# Patient Record
Sex: FEMALE | Race: WHITE | NOT HISPANIC OR LATINO | Employment: UNEMPLOYED | URBAN - METROPOLITAN AREA
[De-identification: names, ages, dates, MRNs, and addresses within clinical notes are randomized per-mention and may not be internally consistent; named-entity substitution may affect disease eponyms.]

---

## 2018-09-25 ENCOUNTER — OFFICE VISIT CONVERTED (OUTPATIENT)
Dept: PULMONOLOGY | Facility: CLINIC | Age: 48
End: 2018-09-25
Attending: INTERNAL MEDICINE

## 2018-12-28 ENCOUNTER — OFFICE VISIT CONVERTED (OUTPATIENT)
Dept: PULMONOLOGY | Facility: CLINIC | Age: 48
End: 2018-12-28
Attending: INTERNAL MEDICINE

## 2019-10-10 ENCOUNTER — HOSPITAL ENCOUNTER (OUTPATIENT)
Dept: MAMMOGRAPHY | Facility: HOSPITAL | Age: 49
Discharge: HOME | End: 2019-10-10

## 2021-05-28 VITALS
HEART RATE: 76 BPM | WEIGHT: 293 LBS | HEART RATE: 83 BPM | RESPIRATION RATE: 12 BRPM | BODY MASS INDEX: 48.82 KG/M2 | TEMPERATURE: 98.2 F | SYSTOLIC BLOOD PRESSURE: 142 MMHG | WEIGHT: 293 LBS | TEMPERATURE: 98.2 F | HEIGHT: 65 IN | DIASTOLIC BLOOD PRESSURE: 75 MMHG | BODY MASS INDEX: 48.82 KG/M2 | HEIGHT: 65 IN | SYSTOLIC BLOOD PRESSURE: 143 MMHG | DIASTOLIC BLOOD PRESSURE: 80 MMHG | RESPIRATION RATE: 16 BRPM | OXYGEN SATURATION: 94 % | OXYGEN SATURATION: 95 %

## 2021-05-28 NOTE — PROGRESS NOTES
Patient: ROSELIA MEDINA     Acct: [id number]     Report: #[id number]  UNIT #: [id number]     : 1970    Encounter Date:2018  PRIMARY CARE: Chato Samaniego  ***Signed***  --------------------------------------------------------------------------------------------------------------------  Chief Complaint      Encounter Date      Sep 25, 2018            Primary Care Provider      Chato Samaniego            Referring Provider      Chato Samaniego            Patient Complaint      Patient is complaining of      asthma            VITALS      Height 5 ft 5.00 in / 165.1 cm      Weight 319 lbs 2.000 oz / 144.807831 kg      BSA 2.67 m2      BMI 53.1 kg/m2      Temperature 98.2 F / 36.78 C - Oral      Pulse 83      Respirations 12      Blood Pressure 142/80 Sitting, Left Arm      Pulse Oximetry 95%, roomair      Exhaled Nitrous Oxide Testin            HPI      The patient is a very pleasant 48 year old superobese  female with a     BMI of 53.1 with reported history of asthma here for evaluation. The patient is     noncompliant with her therapies. She states that she has an inhaler and had had     multiple ones, but only takes them when she feels like she needs them.  She     currently is not interested in taking any inhalers.  She does have dyspnea on     exertion with walking about 8-900 feet, worse with exertion, moderate in     severity and relieved with rest.  This is associated with wheezing and a cough     productive of thin clear sputum.  She also has snoring and excessive daytime     sleepiness.  She states she likely has obstructive sleep apnea, but refuses     testing as she sees the CPAP machine her mother wears and would refuse it under     any circumstance.  She feels a large component of her shortness of breath is     secondary to her obesity. Her BMI has been going up and up and now is 53 today.     Despite this, she feels that she has trouble losing weight because she just does    not have  the motivation to do so.  She does have seasonal allergies well-    controlled with Singulair and denies any recent itchy-scratchy throat, watery     eyes or nasal congestion.  Denies any nausea, vomiting, fevers, chills,     headaches, chest pain or hemoptysis.  She is able to perform her ADLs without     difficulty.  Denies any swollen glands or lymph nodes of the head and neck.            I have personally reviewed the review of systems, past family, social, surgical     and medical histories and I agree with the findings.            ROS      Constitutional:  Denies: Fatigue, Fever, Weight gain, Weight loss, Chills, Inso    mnia, Other      Respiratory/Breathing:  Complains of: Shortness of air, Wheezing, Cough; Denies:    Hemoptysis, Pleuritic pain, Other      Endocrine:  Denies: Polydipsia, Polyuria, Heat/cold intolerance, Abnorml     menstrual pattern, Diabetes, Other      Eyes:  Denies: Blurred vision, Vision Changes, Other      Ears, nose, mouth, throat:  Denies: Mouth lesions, Thrush, Throat pain,     Hoarseness, Allergies/Hay Fever, Post Nasal Drip, Headaches, Recent Head Injury,    Nose Bleeding, Neck Stiffness, Thyroid Mass, Hearing Loss, Ear Fullness, Dry     Mouth, Nasal or Sinus Pain, Dry Lips, Nasal discharge, Nasal congestion, Other      Cardiovascular:  Denies: Palpitations, Syncope, Claudication, Chest Pain, Wake     up Gasping for air, Leg Swelling, Irregular Heart Rate, Cyanosis, Dyspnea on     Exertion, Other      Gastrointestinal:  Denies: Nausea, Constipation, Diarrhea, Abdominal pain,     Vomiting, Difficulty Swallowing, Reflux/Heartburn, Dysphagia, Jaundice,     Bloating, Melena, Bloody stools, Other      Genitourinary:  Denies: Urinary frequency, Incontinence, Hematuria, Urgency,     Nocturia, Dysuria, Testicular problems, Other      Musculoskeletal:  Denies: Joint Pain, Joint Stiffness, Joint Swelling, Myalgias,    Other      Hematologic/lymphatic:  DENIES: Lymphadenopathy, Bruising,  Bleeding tendencies,     Other      Neurological:  Denies: Headache, Numbness, Weakness, Seizures, Other      Psychiatric:  Denies: Anxiety, Appropriate Effect, Depression, Other      Sleep:  No: Excessive daytime sleep, Morning Headache?, Snoring, Insomnia?, Stop    breathing at sleep?, Other      Integumentary:  Denies: Rash, Dry skin, Skin Warm to Touch, Other      Immunologic/Allergic:  Denies: Latex allergy, Seasonal allergies, Asthma,     Urticaria, Eczema, Other      Immunization status:  No: Up to date            FAMILY/SOCIAL/MEDICAL HX      Surgical History:  Yes: Cholecystectomy, Orthopedic Surgery (CYSTS REMOVED FROM     RIGHT ANKLE)      Diabetes - Family Hx:  Mother, Grandparent      Cancer/Type - Family Hx:  Grandparent (x2), Other      Is Father Still Living?:  Yes      Is Mother Still Living?:  Yes       Family History:  Yes      Social History:  No Tobacco Use, No Alcohol Use, No Recreational Drug use      Smoking status:  Never smoker      Anticoagulation Therapy:  No      Antibiotic Prophylaxis:  No      Medical History:  Yes: Asthma, Deafness or Ringing Ears (BILATERAL), High Blood     Pressure, Shortness Of Breath; No: Blood Disease, Chemotherapy/Cancer, Chronic     Bronchitis/COPD, Hemorrhoids/Rectal Prob, Miscellaneous Medical/oth      Psychiatric History      none            PREVENTION      Influenza Vaccine Declined:  Yes      2 or More Falls Past Year?:  No      Fall Past Year with Injury?:  No      Hx Pneumococcal Vaccination:  No      Encouraged to follow-up with:  PCP regarding preventative exams.      Chart initiated by      clay/ ma            ALLERGIES/MEDICATIONS      Allergies:        Coded Allergies:             NO KNOWN DRUG ALLERGIES (Verified  Allergy, Unknown, 9/25/18)      Medications    Last Reconciled on 9/25/18 11:51 by STEPHANIE PAT MD      Cyanocobalamin (Vitamin B-12*) 2,500 Mcg Tab.subl      2500 MCG PO QDAY, #30 TAB.SL         Reported         9/25/18        Calcium Carb/Vit D3 (Calcium Carb 600 + D) 1 Each Tablet      1 EACH PO BID, #120 TAB 0 Refills         Reported         9/25/18       Multivitamin (Multivitamins) 1 Each Capsule      1 EACH PO QDAY, CAP         Reported         9/25/18       Ferrous Sulfate (Iron Sulfate*) 325 Mg Tablet      65 MG PO QDAY, #30 TAB 0 Refills         Reported         9/25/18       Gabapentin (Gabapentin) 250 Mg/5 Ml Solution      300 MG PO HS, #180 ML 0 Refills         Reported         9/25/18       Montelukast Sodium (Montelukast*) 10 Mg Tablet      10 MG PO HS, TAB         Reported         9/25/18       Vortioxetine Hydrobromide (TRINTELLIX) 20 Mg Tablet      20 MG PO HS, #30 TAB 0 Refills         Reported         9/25/18       Atorvastatin (Atorvastatin) 20 Mg Tablet      20 MG PO HS, #30 TAB 0 Refills         Reported         9/25/18       Metoprolol Succinate (Toprol XL*) 25 Mg Tab.er.24h      25 MG PO BID, #60 TAB 0 Refills         Reported         9/25/18       FLUoxetine HCl (PROzac) Unknown Strength Capsule      PO QDAY, #30 CAP 0 Refills         Reported         7/11/17       FLUoxetine HCl (PROzac) 20 Mg Capsule      20 MG PO QDAY         Reported         3/18/13      Current Medications      Current Medications Reviewed 9/25/18            EXAM      Vital Signs Reviewed.      General:  WDWN, Alert, NAD.      HEENT: PERRL, EOMI.  OP, nares clear, no sinus tenderness.      Neck: Supple, no JVD, no thyromegaly.      Lymph: No axillary, cervical, supraclavicular lymphadenopathy noted bilaterally.      Chest: Good aeration, clear to auscultation bilaterally, tympanic to percussion     bilaterally, no work of breathing noted.      CV: RRR, no MGR, pulses 2+, equal.        Abd: Obese, soft, NT, ND, +BS, no HSM.      EXT: No clubbing, no cyanosis, no edema, no joint tenderness.        Neuro:  A  Skin: No rashes or lesions.      Vtials      Vitals:             Height 5 ft 5.00 in / 165.1 cm           Weight 319 lbs 2.000 oz /  144.416466 kg           BSA 2.67 m2           BMI 53.1 kg/m2           Temperature 98.2 F / 36.78 C - Oral           Pulse 83           Respirations 12           Blood Pressure 142/80 Sitting, Left Arm           Pulse Oximetry 95%, roomair            REVIEW      Results Reviewed      [facility] Results Reviewed?:  Yes Prev Lab Results, Yes Prev Radiology Results, Yes     Previous [facility] Records      Lab Results      I personally reviewed office notes from referring provider. Labs show no     peripheral eosinophilia in the past.  The patient also had a chest CT done back     in 2013 which I personally reviewed and was unremarkable.            Assessment      ZAMORA (dyspnea on exertion) - R06.09            Cough - R05            Wheeze - R06.2            Super obese - E66.9            Snoring - R06.83            Excessive daytime sleepiness - G47.19            Seasonal allergies - J30.2            Allergic rhinitis         Seasonal allergic rhinitis due to pollen - J30.1         Allergic rhinitis trigger: pollen         Allergic rhinitis seasonality: seasonal            Notes      New Medications      * Metoprolol Succinate (Toprol XL*) 25 MG TAB.ER.24H: 25 MG PO BID #60      * Atorvastatin 20 MG TABLET: 20 MG PO HS #30      * VORTIOXETINE HYDROBROMIDE (TRINTELLIX) 20 MG TABLET: 20 MG PO HS #30      * MONTELUKAST SODIUM (Montelukast*) 10 MG TABLET: 10 MG PO HS      * Gabapentin 250 MG/5 ML SOLUTION: 300 MG PO HS #180      * FERROUS SULFATE (Iron Sulfate*) 325 MG TABLET: 65 MG PO QDAY #30      * Multivitamin (Multivitamins) 1 EACH CAPSULE: 1 EACH PO QDAY      * Calcium Carb/Vit D3 (Calcium Carb 600 + D) 1 EACH TABLET: 1 EACH PO BID #120      * CYANOCOBALAMIN (Vitamin B-12*) 2,500 MCG TAB.SUBL: 2,500 MCG PO QDAY #30      New Diagnostics      * PFT-Comp, PrePost,DLCO,BodyBox, Week         Dx: ZAMORA (dyspnea on exertion) - R06.09      * Chest W/O Cont CT, SCHEDULED PROCEDURE         Dx: ZAMORA (dyspnea on exertion) - R06.09      *  Immunoglobulin  E (I, Week         Dx: ZAMORA (dyspnea on exertion) - R06.09      * CBC, Month         Dx: ZAMORA (dyspnea on exertion) - R06.09      IMPRESSION:      1.   Dyspnea on exertion.      2.  Cough.      3.  Wheeze.      4. Superobesity, BMI 53.1.      5. Snoring.      6.  Excessive daytime sleepiness.      7.  Seasonal allergies, well-controlled.      8.  Allergic rhinitis, well-controlled.               PLAN:      1.  I performed exhale nitric oxide testing in the office today.  Level was 19     indicative of no eosinophilic airway inflammation.       2.  I offered patient controller inhaler and trial of inhaled corticosteroids     for her asthmatic symptoms, however she declines at this time.  She would like     to get test first and then further evaluate to see whether or not she would want    an inhaler.      3. Check PFTs.      4. Check chest CT without contrast.      5. Check CBC and IgE.      6. Continue Singulair.      7.  I offered patient sleep study and discussed simple nasal pillows for sleep     apnea management.  Unfortunately she still declines this.        8.  I spent 7 minutes doing diet and exercise counseling today including 36     minutes of daily exercise as well as a low fat diet. I think the patient would     benefit from a dietary referral as well as a referral to a bariatric surgery     given her comorbidities and her climbing weight. She declines both at this time     and would like to try it on her own.        9.  The patient defers flu shot for now, will have her PCP do it in a couple of     weeks.      10.  We will have patient follow up with me in 2-3 months to discuss test     results and reassess her symptoms.            Patient Education      ACO BMI High above 25:  Counseling Given, Encouraged weight loss, Encourage     dietary changes            Patient Education:        Asthma -- Adult      DI for Obesity -- Adult                 Disclaimer: Converted document may not contain  table formatting or lab diagrams. Please see [employer] for the authenticated document.

## 2021-05-28 NOTE — PROGRESS NOTES
Patient: ROSELIA MEDINA     Acct: [id number]     Report: #[id number]  UNIT #: [id number]     : 1970    Encounter Date:2018  PRIMARY CARE: Chato Samaniego  ***Signed***  --------------------------------------------------------------------------------------------------------------------  Chief Complaint      Encounter Date      Dec 28, 2018            Primary Care Provider      Chato Samaniego            Referring Provider      Chato Samaniego            Patient Complaint      Patient is complaining of      f/u asthma            VITALS      Height 5 ft 5 in / 165.1 cm      Weight 318 lbs 5 oz / 144.376636 kg      BSA 2.41 m2      BMI 53.0 kg/m2      Temperature 98.2 F / 36.78 C - Oral      Pulse 76      Respirations 16      Blood Pressure 143/75 Sitting, Left Arm      Pulse Oximetry 94%, roomair      Initial Exhaled Nitrous Oxide      Exhaled Nitrous Oxide Results:  19            HPI      The patient is a very pleasant 48 year old  female with super morbid     obesity with BMI of 53.0 with asthma here for follow up.             Since her last office visit she wanted to defer any therapies for asthma until     we got her test results back.  She notes no changes since her last office visit.    She gets short of breath walking about 700-800 feet, moderate in severity, worse    with exertion, better with rest. She also has orthopnea and shortness of breath     while lying flat. She also has wheezing which is worse lying flat and with     dyspnea. Her cough throughout the day is unchanged and productive of thin, clear    sputum. She has a high suspicion  of sleep apnea however she refuses all testing    as she will not wear a CPAP. Her weight has been stable since her last office     visit and she is trying to be more active but she states that the holidays were     difficult for her regarding her weight. She denies any recent itchy, scratchy     throat and watery eyes or nasal congestion.  Her allergies are  well controlled     with Singulair. She denies any heartburn or acid reflux. She denies any nausea     and vomiting, fevers or chills, headaches or hemoptysis or chest pain. She is     able to perform her activities of daily living without difficulty and denies any    swollen lymph nodes or glands in her head and neck.             I have personally reviewed the Review of Systems, past family, social, surgical     and medical histories and I agree with the findings.            ROS      Constitutional:  Denies: Fatigue, Fever, Weight gain, Weight loss, Chills,     Insomnia, Other      Respiratory/Breathing:  Complains of: Shortness of air, Wheezing, Cough; Denies:    Hemoptysis, Pleuritic pain, Other      Endocrine:  Denies: Polydipsia, Polyuria, Heat/cold intolerance, Abnorml     menstrual pattern, Diabetes, Other      Eyes:  Denies: Blurred vision, Vision Changes, Other      Ears, nose, mouth, throat:  Denies: Mouth lesions, Thrush, Throat pain,     Hoarseness, Allergies/Hay Fever, Post Nasal Drip, Headaches, Recent Head Injury,    Nose Bleeding, Neck Stiffness, Thyroid Mass, Hearing Loss, Ear Fullness, Dry     Mouth, Nasal or Sinus Pain, Dry Lips, Nasal discharge, Nasal congestion, Other      Cardiovascular:  Denies: Palpitations, Syncope, Claudication, Chest Pain, Wake     up Gasping for air, Leg Swelling, Irregular Heart Rate, Cyanosis, Dyspnea on     Exertion, Other      Gastrointestinal:  Denies: Nausea, Constipation, Diarrhea, Abdominal pain,     Vomiting, Difficulty Swallowing, Reflux/Heartburn, Dysphagia, Jaundice,     Bloating, Melena, Bloody stools, Other      Genitourinary:  Denies: Urinary frequency, Incontinence, Hematuria, Urgency,     Nocturia, Dysuria, Testicular problems, Other      Musculoskeletal:  Denies: Joint Pain, Joint Stiffness, Joint Swelling, Myalgias,    Other      Hematologic/lymphatic:  DENIES: Lymphadenopathy, Bruising, Bleeding tendencies,     Other      Neurological:  Denies:  Headache, Numbness, Weakness, Seizures, Other      Psychiatric:  Denies: Anxiety, Appropriate Effect, Depression, Other      Sleep:  No: Excessive daytime sleep, Morning Headache?, Snoring, Insomnia?, Stop    breathing at sleep?, Other      Integumentary:  Denies: Rash, Dry skin, Skin Warm to Touch, Other      Immunologic/Allergic:  Denies: Latex allergy, Seasonal allergies, Asthma,     Urticaria, Eczema, Other      Immunization status:  No: Up to date            FAMILY/SOCIAL/MEDICAL HX      Surgical History:  Yes: Cholecystectomy, Orthopedic Surgery (CYSTS REMOVED FROM     RIGHT ANKLE)      Diabetes - Family Hx:  Mother, Grandparent      Cancer/Type - Family Hx:  Grandparent (x2), Other      Is Father Still Living?:  Yes      Is Mother Still Living?:  Yes       Family History:  Yes      Social History:  No Tobacco Use, No Alcohol Use, No Recreational Drug use      Smoking status:  Never smoker      Anticoagulation Therapy:  No      Antibiotic Prophylaxis:  No      Medical History:  Yes: Asthma, Deafness or Ringing Ears (BILATERAL), High Blood     Pressure, Shortness Of Breath; No: Blood Disease, Chemotherapy/Cancer, Chronic     Bronchitis/COPD, Hemorrhoids/Rectal Prob, Sinus Trouble, Miscellaneous     Medical/oth      Psychiatric History      none            PREVENTION      Influenza Vaccine Declined:  Yes      2 or More Falls Past Year?:  No      Fall Past Year with Injury?:  No      Hx Pneumococcal Vaccination:  No      Encouraged to follow-up with:  PCP regarding preventative exams.      Chart initiated by      dmitri santo            ALLERGIES/MEDICATIONS      Allergies:        Coded Allergies:             NO KNOWN DRUG ALLERGIES (Verified  Allergy, Unknown, 12/28/18)      Medications    Last Reconciled on 12/28/18 10:59 by MD KEV HECK-Albuterol (Ventolin HFA) 18 Gm Hfa.aer.ad      2 PUFFS INH QID PRN for DYSPNEA/WHEEZING, #1 MDI 5 Refills         Prov: STEPHANIE PAT         12/28/18        Fluticasone/Vilanterol 200-25 Mcg Inh (Breo Ellipta 200-25 Mcg Inh) 1 Each     Blst.w.dev      1 PUFF INH QDAY, #1 INH 2 Refills         Prov: STEPHANIE PAT         12/28/18       Cyanocobalamin (Vitamin B-12*) 2,500 Mcg Tab.subl      2500 MCG PO QDAY, #30 TAB.SL         Reported         9/25/18       Calcium Carb/Vit D3 (CALCIUM 600-VIT D3 400 TABLET) 1 Each Tablet      1 EACH PO BID, #120 TAB 0 Refills         Reported         9/25/18       Multivitamin (Multivitamins) 1 Each Capsule      1 EACH PO QDAY, CAP         Reported         9/25/18       Ferrous Sulfate (Iron Sulfate*) 325 Mg Tablet      65 MG PO QDAY, #30 TAB 0 Refills         Reported         9/25/18       Gabapentin (Gabapentin) 250 Mg/5 Ml Solution      300 MG PO HS, #180 ML 0 Refills         Reported         9/25/18       Montelukast Sodium (Montelukast*) 10 Mg Tablet      10 MG PO HS, TAB         Reported         9/25/18       Atorvastatin (Atorvastatin) 20 Mg Tablet      20 MG PO HS, #30 TAB 0 Refills         Reported         9/25/18       Metoprolol Succinate (Toprol XL*) 25 Mg Tab.er.24h      25 MG PO BID, #60 TAB 0 Refills         Reported         9/25/18       FLUoxetine HCl (PROzac) Unknown Strength Capsule      PO QDAY, #30 CAP 0 Refills         Reported         7/11/17       FLUoxetine HCl (PROzac) 20 Mg Capsule      20 MG PO QDAY         Reported         3/18/13      Current Medications      Current Medications Reviewed 12/28/18            EXAM      Vital Signs Reviewed.      General:  WDWN, Alert, NAD.      HEENT: PERRL, EOMI.  OP, nares clear, no sinus tenderness.      Chest: Good aeration, clear to auscultation bilaterally, tympanic to percussion     bilaterally, no work of breathing noted.      CV: RRR, no MGR, pulses 2+, equal.        Abd: Obese, soft, NT, ND, +BS, no HSM.      EXT: No clubbing, no cyanosis, trace bilateral lower extremity  edema.        Neuro:  A  Skin: No rashes or lesions.      Vtials      Vitals:              Height 5 ft 5 in / 165.1 cm           Weight 318 lbs 5 oz / 144.830972 kg           BSA 2.41 m2           BMI 53.0 kg/m2           Temperature 98.2 F / 36.78 C - Oral           Pulse 76           Respirations 16           Blood Pressure 143/75 Sitting, Left Arm           Pulse Oximetry 94%, roomair            REVIEW      Results Reviewed      PCCS Results Reviewed?:  Yes Prev Lab Results, Yes Prev Radiology Results, Yes     Previous Mecial Records      Lab Results      I personally reviewed the patient's CBC showing peripheral eosinophilia with 610    absolute eosinophils and IgE elevated at 346.      Radiographic Results               [facility]                PACS RADIOLOGY REPORT            Patient: ROSELIA MEDINA   Acct: #[id number]   Report: #[id number]            UNIT #: [id number]    DOS: 18 0858      INSURANCE:[employer] ACC[employer] NETWORK - [facility]   ORDER #:[id number]      LOCATION:[facility]     : 1970            PROVIDERS      ADMITTING:     ATTENDING: STEPHANIE PAT      FAMILY:  Chato Samaniego   ORDERING:  STEPHANIE PAT         OTHER:    DICTATING:  Cong Lin MD            REQ #:[id number]   [facility]      REASON FOR EXAM:  DYSPNEA      REASON FOR VISIT:  DYSPNEA            *******Signed******         PROCEDURE:   CT CHEST WITHOUT CONTRAST             COMPARISON:   [facility], CT, CHEST W/ CONTRAST, 2017, 7:36.             INDICATIONS:   DYSPNEA,WHEZZING, AND COUGH             TECHNIQUE:   CT images were created without the administration of contrast     material.               PROTOCOL:     Standard imaging protocol performed                RADIATION:     DLP: 539mGy*cm          Automated exposure control was utilized to minimize radiation dose.              FINDINGS:         There is no mediastinal, hilar, or axillary adenopathy.  There are no pleural     effusions.  There is       minimal  atelectasis within the lingula.  The lungs are otherwise clear.  There     is no evidence of       bronchiectasis or peribronchial thickening.  Images of the upper abdomen     demonstrate a normal       appearance of bilateral adrenal glands.  Patient is status post cholecystectomy.     Bony structures       are within normal limits.             CONCLUSION:         1. No acute cardiopulmonary disease identified.  There is minimal atelectasis     within the lingula.        The lungs are otherwise clear.              YOBANY BLANCA MD             Electronically Signed and Approved By: YOBANY BLANCA MD on 2018 at 10:09                           Until signed, this is an unconfirmed preliminary report that may contain      errors and is subject to change.                                              STEBA:      D:18 1009      PFT Results      Patient: ROSELIA LINDSAY DIMITRIOS   Acct: #[id number]   Report: #[id number]            UNIT #: [id number]    DOS:       LOCATION:[facility]     : 1970            PROVIDERS      ADMITTING:     FAMILY:  Chato Samaniego         OTHER:       DICTATING:  STEPHANIE PAT MD            REASON FOR VISIT:  DYSPNEA            *******Signed******                                    [facility]                          [employer] Services                            [place], [place]  [address]               __________________________________________________________________________             Patient Name:                   Attending Physician:      Roselia Lindsay M.D.             Patient Visit # MR #            Admit Date  Disch Date     Location      [id number]    [id number]      2018                 [facility]- -             Date of Birth      1970      __________________________________________________________________________      0821 - DIAGNOSTIC REPORT             PULMONARY FUNCTION TEST             DATE OF  STUDY:      11/14/2018.             SPIROMETRY:      1.  Moderate airflow obstruction present.      2.  FEV1/FVC ratio is normal; however, forced vital capacity is reduced and          total lung capacity is normal, suggesting obstruction.  This is moderate          airflow obstruction as FEV1 is 1.94 L, 65% predicted.      3.  There is significant bronchodilator response as both the FEV1 and FVC          improved by greater than 12% and 200 mL.      4.  Flow volume loop suggests obstruction.             LUNG VOLUMES:      No evidence of hyperinflation or air trapping.             DIFFUSION CAPACITY:      Diffusion capacity is within normal limits at 98% predicted.             OVERALL IMPRESSION:      1.  Moderate airflow obstruction with significant bronchodilator response          present.      2.  Diffusion capacity within normal limits.      3.  These findings can be seen in underlying asthma.             To be electronically signed in [employer]      [id number] STEPHANIE PAT M.D.             AM:rt      D:  11/20/2018 13:27      T:  11/20/2018 13:59      #[id number]             Until signed, this is an unconfirmed preliminary report that may contain      errors and is subject to change.                   Until signed, this is an unconfirmed preliminary report that may contain      errors and is subject to change.                     <Electronically signed by STEPHANIE PAT MD>                11/20/18 1557               STEPHANIE PAT MD                                                                  MULAA:RJT      D:11/20/18 1327            Assessment      Notes      New Medications      * Fluticasone/Vilanterol 200-25 Mcg Inh (Breo Ellipta 200-25 Mcg Inh) 1 EACH       BLST.W.DEV: 1 PUFF INH QDAY #1      * MDI-Albuterol (Ventolin HFA) 18 GM HFA.AER.AD: 2 PUFFS INH QID PRN       DYSPNEA/WHEEZING #1       IMPRESSION:      1.  Dyspnea on exertion.      2. Cough.       3.  Wheeze.      4. Moderate persistent eosinophilic  allergic asthma with FEV1 65% with full     bronchodilator reversibility. Asthma control test score is 11 today signifying     poor control of underlying disease and she certainly would benefit from a     LABA/ICS therapy. Of note, she does have 610 peripheral eosinophils and IgE is     elevated to 350.      5. Allergic rhinitis well controlled.       6. Seasonal allergies well controlled.       7. Super morbid obesity with BMI 53.0.            PLAN:      1. We will start LABA/ICS with Breo 200 1 puff daily. Inhaler education provided    today. If the patient does well for 6-12 months then we can step down therapy     per asthma.       2. I again offered sleep study and sleep testing however she refuses.       3. I spent 4 minutes today counseling the patient diet and exercise. I     encouraged her to exercise for 30-60 minutes daily as well as do an 1800 calorie    a day low fat diet versus ketogenic diet. Again I offered her bariatric surgery     referral and dietary referral but she refuses both. Hopefully breathing will     improve and she will try to be more active.       4. The patient is up to date for flu vaccine. She does not qualify for Prevnar.     We will offer her Pneumovax at the next office visit.       5. Follow up with me in 3 months to reassess symptoms after starting Breo.            Patient Education      ACO BMI High above 25:  Counseling Given, Encouraged weight loss, Encourage     dietary changes      Patient Education Provided:  How to use an Inhaler            Patient Education:        Asthma -- Adult                 Disclaimer: Converted document may not contain table formatting or lab diagrams. Please see [employer] for the authenticated document.

## 2022-12-08 ENCOUNTER — TRANSCRIBE ORDERS (OUTPATIENT)
Dept: ADMINISTRATIVE | Facility: HOSPITAL | Age: 52
End: 2022-12-08

## 2022-12-08 DIAGNOSIS — N93.9 ABNORMAL UTERINE BLEEDING: Primary | ICD-10-CM

## 2023-01-17 ENCOUNTER — HOSPITAL ENCOUNTER (OUTPATIENT)
Dept: ULTRASOUND IMAGING | Facility: HOSPITAL | Age: 53
Discharge: HOME | End: 2023-01-17
Admitting: OBSTETRICS & GYNECOLOGY
Payer: COMMERCIAL

## 2023-01-17 DIAGNOSIS — N93.9 ABNORMAL UTERINE BLEEDING: ICD-10-CM

## 2023-01-17 PROCEDURE — 76830 TRANSVAGINAL US NON-OB: CPT
